# Patient Record
Sex: FEMALE | Race: WHITE | NOT HISPANIC OR LATINO | URBAN - NONMETROPOLITAN AREA
[De-identification: names, ages, dates, MRNs, and addresses within clinical notes are randomized per-mention and may not be internally consistent; named-entity substitution may affect disease eponyms.]

---

## 2020-07-01 ENCOUNTER — LAB OUTSIDE AN ENCOUNTER (OUTPATIENT)
Dept: URBAN - NONMETROPOLITAN AREA CLINIC 2 | Facility: CLINIC | Age: 60
End: 2020-07-01

## 2020-07-03 LAB
A/G RATIO: 1.1
AFP, SERUM, TUMOR MARKER: 9.7
ALBUMIN: 3.8
ALKALINE PHOSPHATASE: 117
ALT (SGPT): 33
AST (SGOT): 50
BILIRUBIN, TOTAL: 0.4
BUN/CREATININE RATIO: 17
BUN: 14
CALCIUM: 9.6
CARBON DIOXIDE, TOTAL: 26
CHLORIDE: 102
CREATININE: 0.83
EGFR IF AFRICN AM: 89
EGFR IF NONAFRICN AM: 77
GLOBULIN, TOTAL: 3.5
GLUCOSE: 132
HEMATOCRIT: 39.4
HEMATOLOGY COMMENTS:: (no result)
HEMOGLOBIN: 12.4
INR: 1.1
MCH: 27.7
MCHC: 31.5
MCV: 88
NRBC: (no result)
PLATELETS: 92
POTASSIUM: 4.3
PROTEIN, TOTAL: 7.3
PROTHROMBIN TIME: 11.5
RBC: 4.47
RDW: 13.4
SODIUM: 142
WBC: 3.5

## 2020-07-16 ENCOUNTER — TELEPHONE ENCOUNTER (OUTPATIENT)
Dept: URBAN - METROPOLITAN AREA CLINIC 92 | Facility: CLINIC | Age: 60
End: 2020-07-16

## 2020-07-16 ENCOUNTER — TELEPHONE ENCOUNTER (OUTPATIENT)
Dept: URBAN - NONMETROPOLITAN AREA CLINIC 2 | Facility: CLINIC | Age: 60
End: 2020-07-16

## 2020-07-16 ENCOUNTER — LAB OUTSIDE AN ENCOUNTER (OUTPATIENT)
Dept: URBAN - METROPOLITAN AREA CLINIC 92 | Facility: CLINIC | Age: 60
End: 2020-07-16

## 2020-09-10 ENCOUNTER — OFFICE VISIT (OUTPATIENT)
Dept: URBAN - NONMETROPOLITAN AREA CLINIC 13 | Facility: CLINIC | Age: 60
End: 2020-09-10

## 2020-12-10 ENCOUNTER — OFFICE VISIT (OUTPATIENT)
Dept: URBAN - NONMETROPOLITAN AREA CLINIC 13 | Facility: CLINIC | Age: 60
End: 2020-12-10

## 2020-12-14 ENCOUNTER — DASHBOARD ENCOUNTERS (OUTPATIENT)
Age: 60
End: 2020-12-14

## 2020-12-14 ENCOUNTER — OFFICE VISIT (OUTPATIENT)
Dept: URBAN - NONMETROPOLITAN AREA CLINIC 2 | Facility: CLINIC | Age: 60
End: 2020-12-14
Payer: SELF-PAY

## 2020-12-14 ENCOUNTER — LAB OUTSIDE AN ENCOUNTER (OUTPATIENT)
Dept: URBAN - NONMETROPOLITAN AREA CLINIC 2 | Facility: CLINIC | Age: 60
End: 2020-12-14

## 2020-12-14 DIAGNOSIS — R77.2 ELEVATED AFP: ICD-10-CM

## 2020-12-14 DIAGNOSIS — Z12.11 SCREENING FOR COLON CANCER: ICD-10-CM

## 2020-12-14 DIAGNOSIS — K74.69 OTHER CIRRHOSIS OF LIVER: ICD-10-CM

## 2020-12-14 PROCEDURE — 99213 OFFICE O/P EST LOW 20 MIN: CPT | Performed by: INTERNAL MEDICINE

## 2020-12-14 PROCEDURE — G8483 FLU IMM NO ADMIN DOC REA: HCPCS | Performed by: INTERNAL MEDICINE

## 2020-12-14 PROCEDURE — G8417 CALC BMI ABV UP PARAM F/U: HCPCS | Performed by: INTERNAL MEDICINE

## 2020-12-14 PROCEDURE — 1036F TOBACCO NON-USER: CPT | Performed by: INTERNAL MEDICINE

## 2020-12-14 PROCEDURE — G9903 PT SCRN TBCO ID AS NON USER: HCPCS | Performed by: INTERNAL MEDICINE

## 2020-12-14 PROCEDURE — G8427 DOCREV CUR MEDS BY ELIG CLIN: HCPCS | Performed by: INTERNAL MEDICINE

## 2020-12-14 PROCEDURE — 3017F COLORECTAL CA SCREEN DOC REV: CPT | Performed by: INTERNAL MEDICINE

## 2020-12-14 RX ORDER — ATORVASTATIN CALCIUM 10 MG/1
TAKE 1 TABLET (10 MG) BY ORAL ROUTE ONCE DAILY TABLET, FILM COATED ORAL 1
Qty: 0 | Refills: 0 | Status: ACTIVE | COMMUNITY
Start: 1900-01-01

## 2020-12-14 RX ORDER — ELECTROLYTES/DEXTROSE
TAKE 1 TABLET BY ORAL ROUTE DAILY SOLUTION, ORAL ORAL 1
Qty: 0 | Refills: 0 | Status: ACTIVE | COMMUNITY
Start: 1900-01-01

## 2020-12-14 RX ORDER — SERTRALINE 50 MG/1
TAKE 1 TABLET (50 MG) BY ORAL ROUTE ONCE DAILY TABLET, FILM COATED ORAL 1
Qty: 0 | Refills: 0 | Status: ACTIVE | COMMUNITY
Start: 1900-01-01

## 2020-12-14 RX ORDER — GUAIFENESIN 600 MG/1
TAKE 1 TABLET (600 MG) BY ORAL ROUTE EVERY 12 HOURS TABLET, EXTENDED RELEASE ORAL 2
Qty: 0 | Refills: 0 | Status: ACTIVE | COMMUNITY
Start: 1900-01-01

## 2020-12-14 NOTE — HPI-TODAY'S VISIT:
Patient is a 60 year old female who was diagnosed with etoh cirrhosis earlier this year during admission with etoh hepatitis, HE, and anasarca. She is on lasic, aldactone, omeproazole, lactulose and overall is doing well. No abdominal swelling or lower extremity edema. She has been sober since dx. She had replaced etoh with sweets and tells me she was diagnosed with diabetes about 2 months ago with a HA1C of 15. She has been started on metformin. She is having a difficult time getting her diet under control and continues to eat sweets and high sugar foods although she knows she should not. She has had some blurred vision when sugar is high. She reports some occasional itching but also has some constipation. Discussed Questran and Hydroxyine for itching and will hold off on these for now as itching isn't severe and she has some fatigue and sleepiness. She is alert and oriented. She occasionally has difficulty "finding her words". She has not had EGD for variceal screening and last had colonoscopy about 6-7 years ago to repeat in 10 years. She had labs in July with low platelets, slightly elevated transaminases and AFP ~9. MRI showed no liver lesion. Due for repeat imaging in February. TG

## 2020-12-15 LAB
A/G RATIO: 1.1
AFP, SERUM, TUMOR MARKER: 5.9
ALBUMIN: 3.9
ALKALINE PHOSPHATASE: 139
ALT (SGPT): 35
AST (SGOT): 44
BASO (ABSOLUTE): 0
BASOS: 1
BILIRUBIN, TOTAL: 0.5
BUN/CREATININE RATIO: 14
BUN: 10
CALCIUM: 9.6
CARBON DIOXIDE, TOTAL: 24
CHLORIDE: 96
CREATININE: 0.73
EGFR IF AFRICN AM: (no result)
EGFR IF NONAFRICN AM: (no result)
EOS (ABSOLUTE): 0.1
EOS: 3
GLOBULIN, TOTAL: 3.4
GLUCOSE: 307
HEMATOCRIT: 36.6
HEMATOLOGY COMMENTS:: (no result)
HEMOGLOBIN: 11.5
IMMATURE CELLS: (no result)
IMMATURE GRANS (ABS): 0
IMMATURE GRANULOCYTES: 0
INR: 1.1
LYMPHS (ABSOLUTE): 0.8
LYMPHS: 22
MCH: 27.1
MCHC: 31.4
MCV: 86
MONOCYTES(ABSOLUTE): 0.4
MONOCYTES: 11
NEUTROPHILS (ABSOLUTE): 2.3
NEUTROPHILS: 63
NRBC: (no result)
PLATELETS: 101
POTASSIUM: 4.2
PROTEIN, TOTAL: 7.3
PROTHROMBIN TIME: 11.6
RBC: 4.25
RDW: 13.1
SODIUM: 135
WBC: 3.6

## 2021-01-04 ENCOUNTER — OFFICE VISIT (OUTPATIENT)
Dept: URBAN - METROPOLITAN AREA MEDICAL CENTER 1 | Facility: MEDICAL CENTER | Age: 61
End: 2021-01-04

## 2021-02-01 ENCOUNTER — OFFICE VISIT (OUTPATIENT)
Dept: URBAN - NONMETROPOLITAN AREA CLINIC 2 | Facility: CLINIC | Age: 61
End: 2021-02-01

## 2025-04-09 ENCOUNTER — OFFICE VISIT (OUTPATIENT)
Dept: URBAN - NONMETROPOLITAN AREA CLINIC 2 | Facility: CLINIC | Age: 65
End: 2025-04-09

## 2025-06-04 ENCOUNTER — OFFICE VISIT (OUTPATIENT)
Dept: URBAN - NONMETROPOLITAN AREA CLINIC 2 | Facility: CLINIC | Age: 65
End: 2025-06-04
Payer: COMMERCIAL

## 2025-06-04 ENCOUNTER — LAB OUTSIDE AN ENCOUNTER (OUTPATIENT)
Dept: URBAN - NONMETROPOLITAN AREA CLINIC 2 | Facility: CLINIC | Age: 65
End: 2025-06-04

## 2025-06-04 DIAGNOSIS — K74.60 CIRRHOSIS: ICD-10-CM

## 2025-06-04 DIAGNOSIS — R77.2 ELEVATED AFP: ICD-10-CM

## 2025-06-04 DIAGNOSIS — Z12.11 SCREENING FOR COLON CANCER: ICD-10-CM

## 2025-06-04 PROCEDURE — 99204 OFFICE O/P NEW MOD 45 MIN: CPT | Performed by: NURSE PRACTITIONER

## 2025-06-04 PROCEDURE — 99244 OFF/OP CNSLTJ NEW/EST MOD 40: CPT | Performed by: NURSE PRACTITIONER

## 2025-06-04 RX ORDER — SERTRALINE 100 MG/1
1 TABLET TABLET, FILM COATED ORAL ONCE A DAY
Refills: 0 | Status: ACTIVE | COMMUNITY
Start: 1900-01-01

## 2025-06-04 RX ORDER — ATORVASTATIN CALCIUM 20 MG/1
1 TABLET TABLET, FILM COATED ORAL ONCE A DAY
Refills: 0 | Status: ACTIVE | COMMUNITY
Start: 1900-01-01

## 2025-06-04 RX ORDER — METOPROLOL TARTRATE 25 MG/1
TAKE ONE TABLET BY MOUTH EVERY MORNING AND TAKE ONE TABLET BY MOUTH AT BEDTIME TABLET, FILM COATED ORAL
Qty: 180 UNSPECIFIED | Refills: 1 | Status: ACTIVE | COMMUNITY

## 2025-06-04 RX ORDER — BUPROPION HYDROCHLORIDE 150 MG/1
TAKE TWO TABLETS BY MOUTH ONE TIME DAILY IN THE MORNING TABLET, EXTENDED RELEASE ORAL
Qty: 180 UNSPECIFIED | Refills: 0 | Status: ACTIVE | COMMUNITY

## 2025-06-04 RX ORDER — METFORMIN HYDROCHLORIDE 750 MG/1
TAKE ONE TABLET BY MOUTH ONE TIME DAILY WITH BREAKFAST TABLET, EXTENDED RELEASE ORAL
Qty: 90 UNSPECIFIED | Refills: 1 | Status: ACTIVE | COMMUNITY

## 2025-06-04 RX ORDER — LACTULOSE 10 G/15ML
TAKE 15 ML BY MOUTH ONE TIME DAILY AS NEEDED ; AIM FOR 2-3 BOWEL MOVEMENTS DAILY SOLUTION ORAL
Qty: 237 UNSPECIFIED | Refills: 0 | Status: ACTIVE | COMMUNITY

## 2025-06-04 RX ORDER — ELECTROLYTES/DEXTROSE
TAKE 1 TABLET BY ORAL ROUTE DAILY SOLUTION, ORAL ORAL 1
Qty: 0 | Refills: 0 | Status: ACTIVE | COMMUNITY
Start: 1900-01-01

## 2025-06-04 RX ORDER — SPIRONOLACTONE 50 MG/1
TAKE ONE TABLET BY MOUTH EVERY MORNING TABLET, COATED ORAL
Qty: 90 UNSPECIFIED | Refills: 0 | Status: ACTIVE | COMMUNITY

## 2025-06-04 RX ORDER — LACTULOSE 10 G/15ML
15ML SOLUTION ORAL ONCE A DAY
Qty: 1350 ML | Refills: 3 | OUTPATIENT
Start: 2025-06-04

## 2025-06-04 RX ORDER — FUROSEMIDE 20 MG/1
TAKE ONE TABLET BY MOUTH EVERY MORNING TABLET ORAL
Qty: 90 UNSPECIFIED | Refills: 0 | Status: ACTIVE | COMMUNITY

## 2025-06-04 RX ORDER — GUAIFENESIN 600 MG/1
TAKE 1 TABLET (600 MG) BY ORAL ROUTE EVERY 12 HOURS TABLET, EXTENDED RELEASE ORAL 2
Qty: 0 | Refills: 0 | Status: ON HOLD | COMMUNITY
Start: 1900-01-01

## 2025-06-04 NOTE — HPI-OTHER HISTORIES
2020: Patient is a 60 year old female who was diagnosed with etoh cirrhosis earlier this year during admission with etoh hepatitis, HE, and anasarca. She is on lasic, aldactone, omeproazole, lactulose and overall is doing well. No abdominal swelling or lower extremity edema. She has been sober since dx. She had replaced etoh with sweets and tells me she was diagnosed with diabetes about 2 months ago with a HA1C of 15. She has been started on metformin. She is having a difficult time getting her diet under control and continues to eat sweets and high sugar foods although she knows she should not. She has had some blurred vision when sugar is high. She reports some occasional itching but also has some constipation. Discussed Questran and Hydroxyine for itching and will hold off on these for now as itching isn't severe and she has some fatigue and sleepiness. She is alert and oriented. She occasionally has difficulty "finding her words". She has not had EGD for variceal screening and last had colonoscopy about 6-7 years ago to repeat in 10 years. She had labs in July with low platelets, slightly elevated transaminases and AFP ~9. MRI showed no liver lesion. Due for repeat imaging in February. TG

## 2025-06-04 NOTE — HPI-TODAY'S VISIT:
Kenzie was referred to our office to reestablish care for cirrhosis.  A copy of this note and recommendations will be sent to the referring provider's office.  Patient was initially diagnosed in 2020 thought secondary to alcohol abuse.  She has been sober since.  She was seen by our office 5 years ago but has followed with her primary care provider since.  Today she feels like her ascites may be worsening.  She is taking Lasix 20 mg and Aldactone 50 mg but has some bloating in her lower abdomen.  Her labs were last checked 3 months ago.  She believes she had an ultrasound within the last 6 months.  She denies any history of hepatic cephalopathy and takes lactulose once daily.  She has 1-2 complete bowel movements per day.  She has never had an EGD to evaluate for varices.  She denies any bleeding.  Her AFP has been elevated in the past but most recent MRI showed no liver lesion.  She is also due for repeat colonoscopy, with last performed in 2014.  No problems with constipation or diarrhea, bleeding, or known family history of colorectal cancer.  LG.

## 2025-06-05 LAB
A/G RATIO: 1.3
ABSOLUTE BASOPHILS: 22
ABSOLUTE EOSINOPHILS: 110
ABSOLUTE LYMPHOCYTES: 620
ABSOLUTE MONOCYTES: 299
ABSOLUTE NEUTROPHILS: 3348
AFP, SERUM, TUMOR MARKER: 5
ALBUMIN: 4.3
ALKALINE PHOSPHATASE: 93
ALT (SGPT): 28
AST (SGOT): 34
BASOPHILS: 0.5
BILIRUBIN, TOTAL: 0.7
BUN/CREATININE RATIO: (no result)
BUN: 16
CALCIUM: 9.8
CARBON DIOXIDE, TOTAL: 28
CHLORIDE: 101
COMMENT(S): (no result)
CREATININE: 1.03
EGFR: 61
EOSINOPHILS: 2.5
GLOBULIN, TOTAL: 3.4
GLUCOSE: 127
HEMATOCRIT: 41.7
HEMOGLOBIN: 13.7
INR: 1.1
LYMPHOCYTES: 14.1
MCH: 26.9
MCHC: 32.9
MCV: 81.8
MONOCYTES: 6.8
MPV: 11.7
NEUTROPHILS: 76.1
PLATELET COUNT: 103
POTASSIUM: 3.9
PROTEIN, TOTAL: 7.7
PT: 11.4
RDW: 14.5
RED BLOOD CELL COUNT: 5.1
SODIUM: 139
WHITE BLOOD CELL COUNT: 4.4